# Patient Record
Sex: FEMALE | Race: WHITE | NOT HISPANIC OR LATINO | URBAN - METROPOLITAN AREA
[De-identification: names, ages, dates, MRNs, and addresses within clinical notes are randomized per-mention and may not be internally consistent; named-entity substitution may affect disease eponyms.]

---

## 2017-02-08 ENCOUNTER — EMERGENCY (EMERGENCY)
Facility: HOSPITAL | Age: 22
LOS: 1 days | Discharge: HOME | End: 2017-02-08
Attending: EMERGENCY MEDICINE | Admitting: EMERGENCY MEDICINE
Payer: COMMERCIAL

## 2017-02-08 VITALS
RESPIRATION RATE: 16 BRPM | DIASTOLIC BLOOD PRESSURE: 80 MMHG | WEIGHT: 119.05 LBS | OXYGEN SATURATION: 100 % | HEIGHT: 65 IN | HEART RATE: 80 BPM | TEMPERATURE: 98 F | SYSTOLIC BLOOD PRESSURE: 118 MMHG

## 2017-02-08 VITALS
HEART RATE: 71 BPM | DIASTOLIC BLOOD PRESSURE: 63 MMHG | RESPIRATION RATE: 18 BRPM | SYSTOLIC BLOOD PRESSURE: 108 MMHG | OXYGEN SATURATION: 100 % | TEMPERATURE: 98 F

## 2017-02-08 DIAGNOSIS — R79.9 ABNORMAL FINDING OF BLOOD CHEMISTRY, UNSPECIFIED: ICD-10-CM

## 2017-02-08 DIAGNOSIS — Z79.2 LONG TERM (CURRENT) USE OF ANTIBIOTICS: ICD-10-CM

## 2017-02-08 DIAGNOSIS — R07.9 CHEST PAIN, UNSPECIFIED: ICD-10-CM

## 2017-02-08 DIAGNOSIS — Z79.899 OTHER LONG TERM (CURRENT) DRUG THERAPY: ICD-10-CM

## 2017-02-08 DIAGNOSIS — F41.9 ANXIETY DISORDER, UNSPECIFIED: ICD-10-CM

## 2017-02-08 PROCEDURE — 80053 COMPREHEN METABOLIC PANEL: CPT

## 2017-02-08 PROCEDURE — 85025 COMPLETE CBC W/AUTO DIFF WBC: CPT

## 2017-02-08 PROCEDURE — 81003 URINALYSIS AUTO W/O SCOPE: CPT

## 2017-02-08 PROCEDURE — 93010 ELECTROCARDIOGRAM REPORT: CPT

## 2017-02-08 PROCEDURE — 83735 ASSAY OF MAGNESIUM: CPT

## 2017-02-08 PROCEDURE — 93005 ELECTROCARDIOGRAM TRACING: CPT

## 2017-02-08 PROCEDURE — 99283 EMERGENCY DEPT VISIT LOW MDM: CPT | Mod: 25

## 2017-02-08 PROCEDURE — 36415 COLL VENOUS BLD VENIPUNCTURE: CPT

## 2017-02-08 PROCEDURE — 99284 EMERGENCY DEPT VISIT MOD MDM: CPT | Mod: 25

## 2017-02-08 NOTE — ED PROVIDER NOTE - ATTENDING CONTRIBUTION TO CARE
21 yof pw abnormal labs, hyperkalemia.  pt denies any sx.    agree w/ PA, nad pt, will check ekg, and labs.    ekg no evidence of hyperkalemia, labs wnl.

## 2017-02-08 NOTE — ED ADULT NURSE NOTE - CHPI ED SYMPTOMS NEG
no dizziness/no vomiting/no fever/no diaphoresis/no back pain/no nausea/no cough/no chills/no syncope/no shortness of breath

## 2017-02-08 NOTE — ED ADULT TRIAGE NOTE - CHIEF COMPLAINT QUOTE
Pt states last week her K was 5.7, as of Monday it was 6.0. Pt has been having intermittent chest pressure and dizziness for the past week. Pt told by her doctor to go to ED. Pt has no significant medical hx, no  symptoms.

## 2017-02-08 NOTE — ED ADULT NURSE NOTE - OBJECTIVE STATEMENT
pt is a 22 y/o female c/o elevated potassium levels. sent here by PCP for repeat blood testing and possible treatment for high K+. pt states her K+ was 5.6 and the 6.0 as of Monday, also reports a zero potassium diet for the last week. pt also c/o intermittent midsternal chest pressure today as well as the last two nights sleeping. denies any n/v/d, SOB, fever/chills, n/v/d, numbness/tingling, significant PMH, daily medications, prior tx. no acute distress, ambulatory, a&ox3, VS stable, EKG done and pt placed on cardiac monitor. resting comfortably in stretcher awaiting further evaluation. will continue to monitor.

## 2023-02-14 NOTE — ED ADULT NURSE NOTE - AGGRAVATING FACTORS
Subjective   Patient ID: Rosibel is a 35 year old female.    Chief Complaint   Patient presents with   • Rash     Patient is here today for rash on vaginal area x1 week.      Rash  This is a new problem. The current episode started 1 to 4 weeks ago. The problem has been gradually worsening since onset. The affected locations include the groin. The rash is characterized by itchiness. She was exposed to nothing. Pertinent negatives include no anorexia, congestion, cough, diarrhea, eye pain, facial edema, fatigue, fever, joint pain, nail changes, rhinorrhea, shortness of breath, sore throat or vomiting. Treatments tried: anti-fungal. The treatment provided mild relief. There is no history of allergies, asthma, eczema or varicella.         Past Medical History:   Diagnosis Date   • Abnormal vaginal bleeding 3/20/2021   • No known problems    • No pertinent past medical history        MEDICATIONS:  Current Outpatient Medications   Medication Sig   • ketoconazole (NIZORAL) 2 % cream Apply topically 2 times daily. Apply thin layer   • ergocalciferol (DRISDOL) 1.25 mg (50,000 units) capsule Take 1 capsule by mouth 1 day a week.   • amLODIPine (NORVASC) 5 MG tablet Take 1 tablet by mouth daily.   • acetic acid-hydroCORTisone (ACETASOL-HC) otic solution Place 3 drops into both ears in the morning and 3 drops in the evening.   • acetaminophen (TYLENOL) 500 MG tablet Take 1 tablet by mouth every 6 hours as needed for Pain.   • ibuprofen (MOTRIN) 600 MG tablet Take 1 tablet by mouth every 6 hours as needed for Pain.   • medroxyPROGESTERone (PROVERA) 10 MG tablet Take 1 tablet by mouth 1 time for 1 dose.     No current facility-administered medications for this visit.       ALLERGIES:  ALLERGIES:  No Known Allergies    PAST SURGICAL HISTORY:  Past Surgical History:   Procedure Laterality Date   •  section, low transverse      2   • No past surgeries         FAMILY HISTORY:  History reviewed. No pertinent family  history.    SOCIAL HISTORY:  Social History     Tobacco Use   • Smoking status: Never   • Smokeless tobacco: Never   Vaping Use   • Vaping Use: never used   Substance Use Topics   • Alcohol use: Yes     Comment: socially   • Drug use: Never         Patient's medications, allergies, past medical, surgical, and social history  were reviewed and updated as appropriate.    Review of Systems   Constitutional: Negative for fatigue and fever.   HENT: Negative for congestion, rhinorrhea and sore throat.    Eyes: Negative for pain.   Respiratory: Negative for cough and shortness of breath.    Gastrointestinal: Negative for anorexia, diarrhea and vomiting.   Musculoskeletal: Negative for joint pain.   Skin: Positive for rash (inner bilateral thighs). Negative for nail changes.   All other systems reviewed and are negative.      Objective   Physical Exam  Vitals and nursing note reviewed.   Constitutional:       General: She is not in acute distress.     Appearance: Normal appearance. She is not ill-appearing, toxic-appearing or diaphoretic.   Cardiovascular:      Rate and Rhythm: Normal rate and regular rhythm.      Pulses: Normal pulses.      Heart sounds: Normal heart sounds.   Pulmonary:      Effort: Pulmonary effort is normal.      Breath sounds: Normal breath sounds.   Skin:     General: Skin is warm and dry.      Capillary Refill: Capillary refill takes less than 2 seconds.      Findings: Rash present.          Neurological:      General: No focal deficit present.      Mental Status: She is alert and oriented to person, place, and time.   Psychiatric:         Mood and Affect: Mood normal.         Behavior: Behavior normal.         Thought Content: Thought content normal.         Judgment: Judgment normal.       Visit Vitals  /70 (BP Location: RUE - Right upper extremity, Patient Position: Sitting, Cuff Size: Regular)   Pulse 70   Temp 98.2 °F (36.8 °C) (Tympanic)   Resp 16   Ht 5' 3\" (1.6 m)   Wt 103.9 kg (229 lb)    LMP 11/15/2022   SpO2 97%   BMI 40.57 kg/m²       Assessment   Problem List Items Addressed This Visit    None  Visit Diagnoses     Tinea cruris    -  Primary    Relevant Medications    ketoconazole (NIZORAL) 2 % cream          This is a 35 year old year-old female who presents with stated pmhx as seen above with concerns she has jock itch x 1 week. She has tried antifungal spray and cream which helped her right region. She describes itch. Discussed exam findings. Advised in supportive care, advised close follow up with PCP in 2-3 days. Advised she start and complete abx course. Advised of symptoms that warrant return to ED.     Instructions provided as documented in the AVS.    Thank you for visiting [facility].  Please follow up with your PCP 1 week.       I have formulated a discharge action plan for this patient:     1) I have given the patient comprehensive discharge instructions and instructed them on the use of any OTC or RX medications involved in their discharge care.   2) I have carefully and comprehensively answered any questions and addressed any concerns that the patient had regarding their medical illness today and their discharge care and follow up.   3) I have carefully and repeatedly discussed with the patient that the patient needs follow up with a primary care provider or specialist, and as necessary I have given this information to the patient.   4)the patient feels comfortable going home at this time, the patient verbally expresses that the understand the discharge action plan and clearly understands to return, proceed to the ER or call 911 if symptoms worsen, and to absolutely follow up as described and directed above.     The provider verified that the discharge instructions and medications documented on this After Visit Summary report were reviewed with patient and/or caregiver.     The patient has appropriate transport home and has verbalized understanding of instructions  given.    none